# Patient Record
Sex: FEMALE | Race: OTHER | HISPANIC OR LATINO | URBAN - METROPOLITAN AREA
[De-identification: names, ages, dates, MRNs, and addresses within clinical notes are randomized per-mention and may not be internally consistent; named-entity substitution may affect disease eponyms.]

---

## 2019-06-26 ENCOUNTER — EMERGENCY (EMERGENCY)
Facility: HOSPITAL | Age: 64
LOS: 1 days | Discharge: HOME | End: 2019-06-26
Admitting: EMERGENCY MEDICINE
Payer: OTHER MISCELLANEOUS

## 2019-06-26 VITALS
OXYGEN SATURATION: 100 % | DIASTOLIC BLOOD PRESSURE: 96 MMHG | TEMPERATURE: 98 F | RESPIRATION RATE: 15 BRPM | SYSTOLIC BLOOD PRESSURE: 151 MMHG | HEART RATE: 89 BPM

## 2019-06-26 VITALS
TEMPERATURE: 98 F | RESPIRATION RATE: 16 BRPM | SYSTOLIC BLOOD PRESSURE: 151 MMHG | OXYGEN SATURATION: 100 % | HEART RATE: 91 BPM | DIASTOLIC BLOOD PRESSURE: 84 MMHG

## 2019-06-26 PROCEDURE — 73030 X-RAY EXAM OF SHOULDER: CPT | Mod: 26,LT

## 2019-06-26 PROCEDURE — 73090 X-RAY EXAM OF FOREARM: CPT | Mod: 26,LT

## 2019-06-26 PROCEDURE — 73080 X-RAY EXAM OF ELBOW: CPT | Mod: 26,LT

## 2019-06-26 PROCEDURE — 99284 EMERGENCY DEPT VISIT MOD MDM: CPT

## 2019-06-26 PROCEDURE — 73060 X-RAY EXAM OF HUMERUS: CPT | Mod: 26,LT

## 2019-06-26 NOTE — ED PROVIDER NOTE - CARE PROVIDER_API CALL
Constantin Arellano)  Orthopedics  [address] [facility] [address]  [place] [address]  Phone: [phone]  Fax: [phone]  Follow Up Time:

## 2019-06-26 NOTE — ED PROVIDER NOTE - PHYSICAL EXAMINATION
LUE: ecchymosis over elbow, + TTP over left elbow with, slight bony deformity felt. pain with extension/ and flexion of elbow and abduction of shoulder. +pulses, no sensation deficits.

## 2019-06-26 NOTE — CONSULT NOTE ADULT - SUBJECTIVE AND OBJECTIVE BOX
64y Female who presents s/p mechanical fall onto left arm. Reports pain and difficulty moving affected extremity afterward. Denies headstrike/LOC. Denies numbness/tingling of the affected extremity. No other bone or joint complaints.    PAST MEDICAL & SURGICAL HISTORY:  No pertinent past medical history  No significant past surgical history    MEDICATIONS  (STANDING):    MEDICATIONS  (PRN):    No Known Allergies      Physical Exam  T(C): 36.9 (06-26-19 @ 13:41), Max: 36.9 (06-26-19 @ 13:41)  HR: 89 (06-26-19 @ 13:41) (89 - 91)  BP: 151/96 (06-26-19 @ 13:41) (151/84 - 151/96)  RR: 15 (06-26-19 @ 13:41) (15 - 16)  SpO2: 100% (06-26-19 @ 13:41) (100% - 100%)  Wt(kg): --    Gen: NAD  LUE: skin intact, some ecchymosis about elbow  AIN/PIN/U intact  SILT M/U/R  2+ radial pulses, cap refill < 2s    Imaging  X-ray: L olecranon fracture    Procedure: Posterior splint applied to LILIANA BURNETTE following placement, post-xrays show good placement    A/P: 64y Female s/p splinting of L olecranon fracture  - pain control  - elevate affected extremity  - splint precautions  - sling as needed  - signs and symptoms of compartment syndrome explained to the patient/family, told to return to ED if they develop  - follow-up with Dr. Arellano in one week. Please call [phone]  to schedule an appointment

## 2019-06-26 NOTE — ED PROVIDER NOTE - OBJECTIVE STATEMENT
63 yo F with no sig PMHx here with c/o of left elbow and arm pain after direct blunt trauma as a result of a trip and fall. Pt explains that a coworker at school was walking in the hallway and had tripped in front of her and she wasn't paying attention, 63 yo F with no sig PMHx here with c/o of left elbow and arm pain after direct blunt trauma as a result of a trip and fall. Pt explains that a coworker at school was walking in the hallway and had tripped in front of her and she wasn't paying attention, and tripped over her coworker. Fell directly on her left elbow. Denies head trauma, LOC, cp ,sob, abdominal pain, nausea, vomiting. Denies weakness, numbness, tingling  Denies other pain, complaints.

## 2019-06-26 NOTE — ED ADULT TRIAGE NOTE - CHIEF COMPLAINT QUOTE
c/o mechanical trip and fall, c/o lft arm pain, pt on 81mg ASA daily, denies hitting head/LOC, denies any other pain/discomfort, lft arm +pulse/motor/sensory, unable to rotate or flex arm denies PMH

## 2019-06-26 NOTE — ED PROVIDER NOTE - CLINICAL SUMMARY MEDICAL DECISION MAKING FREE TEXT BOX
clinical suspicion high for fracture.  Will obtain xr. ortho cs as necessary.  Pt declining pain meds.

## 2019-06-26 NOTE — ED PROVIDER NOTE - NSFOLLOWUPINSTRUCTIONS_ED_ALL_ED_FT
Follow up with Dr. Ramirez in one week.  If results or reports were given to you, show copies of your reports given to you. Take all of your medications as previously prescribed.   Do Not use left arm for any weight bearing activities.   Take Motrin 600mg every 8hrs with food for pain  Take Tylenol 650mg 1 tab every 4-6 hours as needed for pain.    IF any worsening pain, swelling, numbness, tingling or any other new or concerning symptoms return to the ED.

## 2019-06-28 PROBLEM — Z00.00 ENCOUNTER FOR PREVENTIVE HEALTH EXAMINATION: Status: ACTIVE | Noted: 2019-06-28

## 2019-07-01 ENCOUNTER — APPOINTMENT (OUTPATIENT)
Dept: ORTHOPEDIC SURGERY | Facility: CLINIC | Age: 64
End: 2019-07-01
Payer: OTHER MISCELLANEOUS

## 2019-07-01 ENCOUNTER — OUTPATIENT (OUTPATIENT)
Dept: OUTPATIENT SERVICES | Facility: HOSPITAL | Age: 64
LOS: 1 days | End: 2019-07-01
Payer: OTHER MISCELLANEOUS

## 2019-07-01 VITALS
DIASTOLIC BLOOD PRESSURE: 81 MMHG | HEART RATE: 81 BPM | BODY MASS INDEX: 27.32 KG/M2 | SYSTOLIC BLOOD PRESSURE: 169 MMHG | HEIGHT: 66 IN | WEIGHT: 170 LBS

## 2019-07-01 VITALS
DIASTOLIC BLOOD PRESSURE: 80 MMHG | HEIGHT: 64 IN | OXYGEN SATURATION: 99 % | WEIGHT: 177.91 LBS | SYSTOLIC BLOOD PRESSURE: 140 MMHG | RESPIRATION RATE: 16 BRPM | HEART RATE: 79 BPM

## 2019-07-01 DIAGNOSIS — Z78.9 OTHER SPECIFIED HEALTH STATUS: ICD-10-CM

## 2019-07-01 DIAGNOSIS — S52.022A DISPLACED FRACTURE OF OLECRANON PROCESS WITHOUT INTRAARTICULAR EXTENSION OF LEFT ULNA, INITIAL ENCOUNTER FOR CLOSED FRACTURE: ICD-10-CM

## 2019-07-01 DIAGNOSIS — S42.402A UNSPECIFIED FRACTURE OF LOWER END OF LEFT HUMERUS, INITIAL ENCOUNTER FOR CLOSED FRACTURE: ICD-10-CM

## 2019-07-01 DIAGNOSIS — Z98.51 TUBAL LIGATION STATUS: Chronic | ICD-10-CM

## 2019-07-01 LAB
ANION GAP SERPL CALC-SCNC: 13 MMO/L — SIGNIFICANT CHANGE UP (ref 7–14)
BUN SERPL-MCNC: 19 MG/DL — SIGNIFICANT CHANGE UP (ref 7–23)
CALCIUM SERPL-MCNC: 10 MG/DL — SIGNIFICANT CHANGE UP (ref 8.4–10.5)
CHLORIDE SERPL-SCNC: 103 MMOL/L — SIGNIFICANT CHANGE UP (ref 98–107)
CO2 SERPL-SCNC: 24 MMOL/L — SIGNIFICANT CHANGE UP (ref 22–31)
CREAT SERPL-MCNC: 0.8 MG/DL — SIGNIFICANT CHANGE UP (ref 0.5–1.3)
GLUCOSE SERPL-MCNC: 97 MG/DL — SIGNIFICANT CHANGE UP (ref 70–99)
HCT VFR BLD CALC: 38.4 % — SIGNIFICANT CHANGE UP (ref 34.5–45)
HGB BLD-MCNC: 12 G/DL — SIGNIFICANT CHANGE UP (ref 11.5–15.5)
MCHC RBC-ENTMCNC: 26.3 PG — LOW (ref 27–34)
MCHC RBC-ENTMCNC: 31.3 % — LOW (ref 32–36)
MCV RBC AUTO: 84.2 FL — SIGNIFICANT CHANGE UP (ref 80–100)
NRBC # FLD: 0 K/UL — SIGNIFICANT CHANGE UP (ref 0–0)
PLATELET # BLD AUTO: 561 K/UL — HIGH (ref 150–400)
PMV BLD: 9.9 FL — SIGNIFICANT CHANGE UP (ref 7–13)
POTASSIUM SERPL-MCNC: 3.7 MMOL/L — SIGNIFICANT CHANGE UP (ref 3.5–5.3)
POTASSIUM SERPL-SCNC: 3.7 MMOL/L — SIGNIFICANT CHANGE UP (ref 3.5–5.3)
RBC # BLD: 4.56 M/UL — SIGNIFICANT CHANGE UP (ref 3.8–5.2)
RBC # FLD: 14.8 % — HIGH (ref 10.3–14.5)
SODIUM SERPL-SCNC: 140 MMOL/L — SIGNIFICANT CHANGE UP (ref 135–145)
WBC # BLD: 11.44 K/UL — HIGH (ref 3.8–10.5)
WBC # FLD AUTO: 11.44 K/UL — HIGH (ref 3.8–10.5)

## 2019-07-01 PROCEDURE — 99203 OFFICE O/P NEW LOW 30 MIN: CPT

## 2019-07-01 PROCEDURE — 93010 ELECTROCARDIOGRAM REPORT: CPT

## 2019-07-01 PROCEDURE — 73080 X-RAY EXAM OF ELBOW: CPT | Mod: LT

## 2019-07-01 NOTE — H&P PST ADULT - HISTORY OF PRESENT ILLNESS
Pt is a 64 yr old female scheduled for left elbow Olecranon Open Reduction Internal Fixation with dr Arellano 7/2/19 - Pt fell at work 6/26/19 and was seen in ER. Pt has ACE wrapped around left lower arm including elbow to support arm. Pt c/o of pain especially at night of 5-6/10 -

## 2019-07-01 NOTE — H&P PST ADULT - NSICDXPROBLEM_GEN_ALL_CORE_FT
PROBLEM DIAGNOSES  Problem: Left elbow fracture  Assessment and Plan: Pt scheduled for surgery and preop instructions including instructions for taking Famotidine the day of surgery, given verbally and with use of  written materials, and patient confirming understanding of such instructions using  teach back method.  Pt abnormal EKG reviewed by Dr Rivera and CC required. Pt to see Dr Kerr in am - Dr Arellano notified and case cancelled - pt to see Cardio and case rescheduled

## 2019-07-01 NOTE — PHYSICAL EXAM
[de-identified] : Oriented to time, place, person\par Mood: Normal\par Affect: Normal\par Appearance: Healthy, well appearing, no acute distress.\par Gait: Normal\par Assistive Devices: None\par \par Left elbow exam\par \par Skin: Clean, dry, intact. Moderate ecchymosis. Significant swelling. No palpable joint effusion.\par ROM: 20-50, full supination/pronation.  \par Painful ROM: Painful extension/flexion\par Tenderness: Tendon with posterior elbow at fracture. \par Strength: Unable to test given pain \par Stability: Stable \par Vasc: 2+ radial pulse, <2s cap refill\par Sensation: In tact to light touch throughout\par Neuro: AIN/PIN/Ulnar nerve in tact to motor/sensation.\par \par  [de-identified] : \par The following radiographs were ordered and read by me during this patients visit. I reviewed each radiograph in detail with the patient and discussed the findings as highlighted below. \par \par 3 views of the left elbow were obtained today that show acute displaced olecranon fracture with moderate articular comminution.

## 2019-07-01 NOTE — HISTORY OF PRESENT ILLNESS
[de-identified] : 64 year old female presents today with left elbow fx 6/26/19. She fell in school trying to give something to her co-worker. She was evaluated at [facility] and dx with fracture. patient was placed in a splint and told to follow up with ortho. Taking Ibuprofen for pain. Denies numbness or tingling. \par \par The patient's past medical history, past surgical history, medications and allergies were reviewed by me today with the patient and documented accordingly. In addition, the patient's family and social history, which were noncontributory to this visit, were reviewed also.

## 2019-07-01 NOTE — DISCUSSION/SUMMARY
[de-identified] : 64-year-old female with left olecranon fracture\par \par Patient has sustained a displaced left olecranon fracture with moderate comminution. Given displaced fragments, recommendations for surgical management with surgical ORIF. Patient has a clean otherwise medical history, but has seen a cardiologist in the past in [place]. Would recommend urgent PST's to stratify her for acute intervention and surgical ORIF.\par \par Patient voiced understanding, will tentatively schedule for tomorrow morning for surgical intervention. All risks, benefits and alternatives to the proposed surgical procedure, left olecranon ORIF, as well as the need for formal post-operative rehabilitation were discussed in great detail with the patient. Risks include but are not limited to pain, bleeding, infection, neurovascular injury, stiffness, palpable hardware, failure, arthrosis, medical complications (including DVT, PE, MI), and risks of anesthesia. \par \par The patient expressed understanding and all questions were answered. The patient is electing to proceed, and will have the patient scheduled accordingly.

## 2019-07-01 NOTE — H&P PST ADULT - NSANTHOSAYNRD_GEN_A_CORE
No. KALEY screening performed.  STOP BANG Legend: 0-2 = LOW Risk; 3-4 = INTERMEDIATE Risk; 5-8 = HIGH Risk

## 2019-07-01 NOTE — H&P PST ADULT - MUSCULOSKELETAL COMMENTS
Left arm pain with movement - especially at night 5/10 Left arm weakness - left fingers swollen with reduced strength in  - pt has lower arm in ACE wrap supporting elbow

## 2019-07-02 ENCOUNTER — TRANSCRIPTION ENCOUNTER (OUTPATIENT)
Age: 64
End: 2019-07-02

## 2019-07-02 RX ORDER — OXYCODONE AND ACETAMINOPHEN 5; 325 MG/1; MG/1
5-325 TABLET ORAL
Qty: 30 | Refills: 0 | Status: ACTIVE | COMMUNITY
Start: 2019-07-02 | End: 1900-01-01

## 2019-07-02 NOTE — ASU DISCHARGE PLAN (ADULT/PEDIATRIC) - ASU DC SPECIAL INSTRUCTIONSFT
Keep splint in place  Keep splint clean and dry  No weight bearing LUE  Pain medications as prescribed  Follow up 1 week with Dr. Arellano

## 2019-07-02 NOTE — ASU DISCHARGE PLAN (ADULT/PEDIATRIC) - CALL YOUR DOCTOR IF YOU HAVE ANY OF THE FOLLOWING:
Bleeding that does not stop/Swelling that gets worse/Pain not relieved by Medications/Numbness, tingling, color or temperature change to extremity/Nausea and vomiting that does not stop/Unable to urinate/Fever greater than (need to indicate Fahrenheit or Celsius)/Wound/Surgical Site with redness, or foul smelling discharge or pus/Inability to tolerate liquids or foods

## 2019-07-02 NOTE — ASU DISCHARGE PLAN (ADULT/PEDIATRIC) - CARE PROVIDER_API CALL
Constantin Arellano)  Orthopedics  [address] [facility] [address]  [place] [address]  Phone: [phone]  Fax: [phone]  Follow Up Time:

## 2019-07-03 ENCOUNTER — OUTPATIENT (OUTPATIENT)
Dept: OUTPATIENT SERVICES | Facility: HOSPITAL | Age: 64
LOS: 1 days | Discharge: HOME | End: 2019-07-03
Payer: OTHER MISCELLANEOUS

## 2019-07-03 ENCOUNTER — APPOINTMENT (OUTPATIENT)
Dept: ORTHOPEDIC SURGERY | Facility: AMBULATORY SURGERY CENTER | Age: 64
End: 2019-07-03

## 2019-07-03 VITALS
WEIGHT: 177.91 LBS | OXYGEN SATURATION: 99 % | HEIGHT: 64 IN | RESPIRATION RATE: 16 BRPM | DIASTOLIC BLOOD PRESSURE: 80 MMHG | HEART RATE: 79 BPM | SYSTOLIC BLOOD PRESSURE: 140 MMHG

## 2019-07-03 VITALS
HEART RATE: 65 BPM | OXYGEN SATURATION: 98 % | DIASTOLIC BLOOD PRESSURE: 64 MMHG | RESPIRATION RATE: 16 BRPM | TEMPERATURE: 98 F | SYSTOLIC BLOOD PRESSURE: 135 MMHG

## 2019-07-03 DIAGNOSIS — Z98.51 TUBAL LIGATION STATUS: Chronic | ICD-10-CM

## 2019-07-03 DIAGNOSIS — S52.022A DISPLACED FRACTURE OF OLECRANON PROCESS WITHOUT INTRAARTICULAR EXTENSION OF LEFT ULNA, INITIAL ENCOUNTER FOR CLOSED FRACTURE: ICD-10-CM

## 2019-07-03 PROCEDURE — 24685 OPTX ULNAR FX PROX END W/FIX: CPT | Mod: LT

## 2019-07-03 RX ORDER — ASPIRIN/CALCIUM CARB/MAGNESIUM 324 MG
1 TABLET ORAL
Qty: 0 | Refills: 0 | DISCHARGE

## 2019-07-03 RX ORDER — IBUPROFEN 200 MG
1 TABLET ORAL
Qty: 0 | Refills: 0 | DISCHARGE

## 2019-07-03 NOTE — ASU PREOPERATIVE ASSESSMENT, ADULT ([facility]ARK ONLY) - PROCEDURE
I concur with the Admission Order and I certify that services are provided in accordance with Section 42 CFR § 412.3 orif left elbow

## 2019-07-03 NOTE — BRIEF OPERATIVE NOTE - OPERATION/FINDINGS
fracture of left olecranon with articular impaction, cancellous bone graft used to correct impaction

## 2019-07-03 NOTE — BRIEF OPERATIVE NOTE - NSICDXBRIEFPREOP_GEN_ALL_CORE_FT
PRE-OP DIAGNOSIS:  Fracture of left olecranon process 03-Jul-2019 17:53:16  Claude Strickland  Fracture of left olecranon process 03-Jul-2019 17:53:08  Claude Strickland

## 2019-07-15 ENCOUNTER — APPOINTMENT (OUTPATIENT)
Dept: ORTHOPEDIC SURGERY | Facility: CLINIC | Age: 64
End: 2019-07-15
Payer: OTHER MISCELLANEOUS

## 2019-07-15 PROCEDURE — 73080 X-RAY EXAM OF ELBOW: CPT | Mod: LT

## 2019-07-15 PROCEDURE — 99024 POSTOP FOLLOW-UP VISIT: CPT

## 2019-07-16 NOTE — HISTORY OF PRESENT ILLNESS
[2] : the patient reports pain that is 2/10 in severity [Chills] : no chills [Fever] : no fever [Nausea] : no nausea [Vomiting] : no vomiting [Clean/Dry/Intact] : clean, dry and intact [Healed] : healed [Erythema] : not erythematous [Discharge] : absent of discharge [Swelling] : not swollen [Dehiscence] : not dehisced [Neuro Intact] : an unremarkable neurological exam [Vascular Intact] : ~T peripheral vascular exam normal [Doing Well] : is doing well [Excellent Pain Control] : has excellent pain control [No Sign of Infection] : is showing no signs of infection [Sutures Removed] : sutures were removed [Steri-Strips Removed & Replaced] : steri-strips removed and replaced [de-identified] : 64-year-old female status post left olecranon ORIF 7/3/19 [de-identified] : 64-year-old female status post left olecranon ORIF. She is doing well. She presents in splint and sling. She is not taking pain medication. Denies post op complication.  [de-identified] : Left elbow exam\par \par Skin: Incision clean dry and intact. Minimal ecchymosis/swelling \par ROM: Functional early range of motion to 90° flexion\par Painful ROM: Mild pain with extension/flexion\par Tenderness: Tender to the posterior elbow\par Strength: Not tested \par Stability: Stable\par Vasc: 2+ radial pulse, <2s cap refill\par Sensation: In tact to light touch throughout\par Neuro: AIN/PIN/Ulnar nerve in tact to motor/sensation.\par \par  [de-identified] : \par The following radiographs were ordered and read by me during this patients visit. I reviewed each radiograph in detail with the patient and discussed the findings as highlighted below. \par \par 3 views of the left elbow were obtained today that show anatomic fixation of olecranon fracture with maintenance of joint space. [de-identified] : 64-year-old female status post left olecranon ORIF\par \par Patient is postop from left olecranon ORIF. Discussed degree of articular comminution with the patient, and need for local bone grafting. The patient voiced understanding. Articular surface is well maintained and is anatomical today's x-ray imaging. No current concerns.\par \par Patient was placed into a hinged elbow brace with limitation of terminal flexion. Will begin progressive range of motion with physical therapy to tolerance.\par \par Continue ice/pain medication p.r.n. Transition to Tylenol p.r.n.\par \par PT as prescribed.\par \par Followup 4 weeks

## 2019-08-09 PROBLEM — S42.402A UNSPECIFIED FRACTURE OF LOWER END OF LEFT HUMERUS, INITIAL ENCOUNTER FOR CLOSED FRACTURE: Chronic | Status: ACTIVE | Noted: 2019-07-01

## 2019-08-20 ENCOUNTER — APPOINTMENT (OUTPATIENT)
Dept: ORTHOPEDIC SURGERY | Facility: CLINIC | Age: 64
End: 2019-08-20
Payer: OTHER MISCELLANEOUS

## 2019-08-20 PROCEDURE — 73080 X-RAY EXAM OF ELBOW: CPT | Mod: LT

## 2019-08-20 PROCEDURE — 99024 POSTOP FOLLOW-UP VISIT: CPT

## 2019-08-20 NOTE — ADDENDUM
[FreeTextEntry1] : This note was written by Aleida Palacios on 08/20/2019 acting solely as a scribe for Dr. Constantin Arellano.\par \par All medical record entries made by the Scribe were at my, Dr. Constantin Arellano, direction and personally dictated by me on 08/20/2019. I have personally reviewed the chart and agree that the record accurately reflects my personal performance of the history, physical exam, assessment and plan.

## 2019-08-20 NOTE — HISTORY OF PRESENT ILLNESS
[Clean/Dry/Intact] : clean, dry and intact [Healed] : healed [Neuro Intact] : an unremarkable neurological exam [Vascular Intact] : ~T peripheral vascular exam normal [Doing Well] : is doing well [Excellent Pain Control] : has excellent pain control [No Sign of Infection] : is showing no signs of infection [3] : the patient reports pain that is 3/10 in severity [Chills] : no chills [Fever] : no fever [Nausea] : no nausea [Vomiting] : no vomiting [Erythema] : not erythematous [Discharge] : absent of discharge [Swelling] : not swollen [Dehiscence] : not dehisced [de-identified] : 64-year-old female status post left olecranon ORIF 7/3/19 [de-identified] : 64-year-old female status post left olecranon ORIF. She is doing well. Attending PT 2 x per week. Reports occasional pain with certain movements.  She presents in elbow brace. She is not taking pain medication. Denies post op complication.  [de-identified] : Left elbow exam\par \par Skin: Incision clean dry and intact. No ecchymosis/swelling \par ROM:  degrees\par Painful ROM: none\par Tenderness: Non tender to the posterior elbow\par Strength: Adequate\par Stability: Stable\par Vasc: 2+ radial pulse, <2s cap refill\par Sensation: In tact to light touch throughout\par Neuro: AIN/PIN/Ulnar nerve in tact to motor/sensation.\par  [de-identified] : 64-year-old female status post left olecranon ORIF\par \par Patient is postop from left olecranon ORIF. She is doing very will with continued mild loss of flexion and extension. Recommend discontinuation of elbow brace at this time. Patient may begin strengthening and conditioning of the left elbow given healed fracture at this time. Continued good maintenance of joint space and alignment. No current hardware irritation.\par \par Recommendations: Continue therapy. Return to work in September as an . \par \par Follow up 2 months. [de-identified] : The following radiographs were ordered and read by me during this patients visit. I reviewed each radiograph in detail with the patient and discussed the findings as highlighted below.   3 views of the left elbow were obtained today that show a healing anatomic fixation of olecranon fracture with maintenance of joint space.

## 2019-10-09 ENCOUNTER — APPOINTMENT (OUTPATIENT)
Dept: ORTHOPEDIC SURGERY | Facility: CLINIC | Age: 64
End: 2019-10-09
Payer: OTHER MISCELLANEOUS

## 2019-10-09 VITALS
HEART RATE: 84 BPM | WEIGHT: 170 LBS | BODY MASS INDEX: 27.32 KG/M2 | DIASTOLIC BLOOD PRESSURE: 81 MMHG | SYSTOLIC BLOOD PRESSURE: 160 MMHG | HEIGHT: 66 IN

## 2019-10-09 PROCEDURE — 99213 OFFICE O/P EST LOW 20 MIN: CPT

## 2019-10-09 PROCEDURE — 73080 X-RAY EXAM OF ELBOW: CPT | Mod: LT

## 2019-12-05 ENCOUNTER — APPOINTMENT (OUTPATIENT)
Dept: ORTHOPEDIC SURGERY | Facility: CLINIC | Age: 64
End: 2019-12-05
Payer: COMMERCIAL

## 2019-12-05 DIAGNOSIS — S52.022D DISPLACED FRACTURE OF OLECRANON PROCESS W/OUT INTRAARTICULAR EXTENSION OF LEFT ULNA, SUBSEQUENT ENCOUNTER FOR CLOSED FRACTURE WITH ROUTINE HEALING: ICD-10-CM

## 2019-12-05 PROCEDURE — 99213 OFFICE O/P EST LOW 20 MIN: CPT

## 2019-12-11 PROBLEM — S52.022D CLOSED FRACTURE OF LEFT OLECRANON PROCESS WITH ROUTINE HEALING, SUBSEQUENT ENCOUNTER: Status: ACTIVE | Noted: 2019-07-01

## 2019-12-11 NOTE — PHYSICAL EXAM
[de-identified] : 3 views of the left elbow were obtained 8.20.19 that show a healing anatomic fixation of olecranon fracture with maintenance of joint space.  [de-identified] : Left elbow exam\par \par Skin: Incision clean dry and intact. No ecchymosis/swelling \par ROM:  degrees\par Painful ROM: none\par Tenderness: Nontender to the posterior elbow\par Strength: 4/5 Ext/flex\par Stability: Stable\par Vasc: 2+ radial pulse, <2s cap refill\par Sensation: In tact to light touch throughout\par Neuro: AIN/PIN/Ulnar nerve in tact to motor/sensation.\par

## 2019-12-11 NOTE — HISTORY OF PRESENT ILLNESS
[de-identified] : 64-year-old female status post left olecranon ORIF 7.3.19. She is doing well. Reports occasional pain with certain movements and has some loss of terminal extension. Otherwise doing well without complaints. She is not taking pain medication. Denies post op complication.\par

## 2019-12-11 NOTE — DISCUSSION/SUMMARY
[de-identified] : 64-year-old female status post left olecranon ORIF\par \par Patient is doing extremely well with left elbow function following open reduction internal fixation. Patient has some mild loss of terminal extension/flexion, but strength is returning to the upper extremity. Patient denies any significant pain and dysfunction in the scapula for current progress.\par \par Recommendations: Continue PT/HEP, activity to tolerance\par \par Follow up 3 months.

## 2020-01-02 ENCOUNTER — APPOINTMENT (OUTPATIENT)
Dept: ORTHOPEDIC SURGERY | Facility: CLINIC | Age: 65
End: 2020-01-02